# Patient Record
Sex: FEMALE | Race: WHITE
[De-identification: names, ages, dates, MRNs, and addresses within clinical notes are randomized per-mention and may not be internally consistent; named-entity substitution may affect disease eponyms.]

---

## 2020-05-22 ENCOUNTER — HOSPITAL ENCOUNTER (EMERGENCY)
Dept: HOSPITAL 63 - ER | Age: 70
Discharge: HOME | End: 2020-05-22
Payer: MEDICARE

## 2020-05-22 VITALS — DIASTOLIC BLOOD PRESSURE: 62 MMHG | SYSTOLIC BLOOD PRESSURE: 141 MMHG

## 2020-05-22 VITALS — BODY MASS INDEX: 29.58 KG/M2 | WEIGHT: 188.5 LBS | HEIGHT: 67 IN

## 2020-05-22 DIAGNOSIS — Z88.1: ICD-10-CM

## 2020-05-22 DIAGNOSIS — Z88.8: ICD-10-CM

## 2020-05-22 DIAGNOSIS — T45.1X5A: ICD-10-CM

## 2020-05-22 DIAGNOSIS — D70.1: ICD-10-CM
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
## 2020-05-22 DIAGNOSIS — B37.0: Primary | ICD-10-CM

## 2020-05-22 DIAGNOSIS — Z88.5: ICD-10-CM

## 2020-05-22 LAB
ALBUMIN SERPL-MCNC: 2.8 G/DL (ref 3.4–5)
ALBUMIN/GLOB SERPL: 0.8 {RATIO} (ref 1–1.7)
ALP SERPL-CCNC: 76 U/L (ref 46–116)
ALT SERPL-CCNC: 17 U/L (ref 14–59)
ANION GAP SERPL CALC-SCNC: 8 MMOL/L (ref 6–14)
APTT PPP: YELLOW S
AST SERPL-CCNC: 10 U/L (ref 15–37)
BACTERIA #/AREA URNS HPF: (no result) /HPF
BASOPHILS # BLD AUTO: 0 X10^3/UL (ref 0–0.2)
BASOPHILS NFR BLD: 2 % (ref 0–3)
BILIRUB SERPL-MCNC: 0.5 MG/DL (ref 0.2–1)
BILIRUB UR QL STRIP: (no result)
BUN/CREAT SERPL: 30 (ref 6–20)
CA-I SERPL ISE-MCNC: 18 MG/DL (ref 7–20)
CALCIUM SERPL-MCNC: 9.5 MG/DL (ref 8.5–10.1)
CHLORIDE SERPL-SCNC: 100 MMOL/L (ref 98–107)
CO2 SERPL-SCNC: 31 MMOL/L (ref 21–32)
CREAT SERPL-MCNC: 0.6 MG/DL (ref 0.6–1)
EOSINOPHIL NFR BLD: 0 X10^3/UL (ref 0–0.7)
EOSINOPHIL NFR BLD: 3 % (ref 0–3)
ERYTHROCYTE [DISTWIDTH] IN BLOOD BY AUTOMATED COUNT: 18.5 % (ref 11.5–14.5)
FIBRINOGEN PPP-MCNC: CLEAR MG/DL
GFR SERPLBLD BASED ON 1.73 SQ M-ARVRAT: 99.4 ML/MIN
GLOBULIN SER-MCNC: 3.4 G/DL (ref 2.2–3.8)
GLUCOSE SERPL-MCNC: 135 MG/DL (ref 70–99)
GLUCOSE UR STRIP-MCNC: (no result) MG/DL
HCT VFR BLD CALC: 28.8 % (ref 36–47)
HGB BLD-MCNC: 9.3 G/DL (ref 12–15.5)
LYMPHOCYTES # BLD: 0.1 X10^3/UL (ref 1–4.8)
LYMPHOCYTES NFR BLD AUTO: 25 % (ref 24–48)
MCH RBC QN AUTO: 26 PG (ref 25–35)
MCHC RBC AUTO-ENTMCNC: 32 G/DL (ref 31–37)
MCV RBC AUTO: 82 FL (ref 79–100)
MONO #: 0.1 X10^3/UL (ref 0–1.1)
MONOCYTES NFR BLD: 23 % (ref 0–9)
NEUT #: 0.1 X10^3UL (ref 1.8–7.7)
NEUTROPHILS NFR BLD AUTO: 47 % (ref 31–73)
NITRITE UR QL STRIP: (no result)
PLATELET # BLD AUTO: 85 X10^3/UL (ref 140–400)
POTASSIUM SERPL-SCNC: 3.3 MMOL/L (ref 3.5–5.1)
PROT SERPL-MCNC: 6.2 G/DL (ref 6.4–8.2)
RBC # BLD AUTO: 3.51 X10^6/UL (ref 3.5–5.4)
RBC #/AREA URNS HPF: (no result) /HPF (ref 0–2)
SODIUM SERPL-SCNC: 139 MMOL/L (ref 136–145)
SP GR UR STRIP: 1.02
SQUAMOUS #/AREA URNS LPF: (no result) /LPF
UROBILINOGEN UR-MCNC: 1 MG/DL
WBC # BLD AUTO: 0.3 X10^3/UL (ref 4–11)
WBC #/AREA URNS HPF: (no result) /HPF (ref 0–4)

## 2020-05-22 PROCEDURE — 85025 COMPLETE CBC W/AUTO DIFF WBC: CPT

## 2020-05-22 PROCEDURE — 36415 COLL VENOUS BLD VENIPUNCTURE: CPT

## 2020-05-22 PROCEDURE — 99284 EMERGENCY DEPT VISIT MOD MDM: CPT

## 2020-05-22 PROCEDURE — 71046 X-RAY EXAM CHEST 2 VIEWS: CPT

## 2020-05-22 PROCEDURE — 80053 COMPREHEN METABOLIC PANEL: CPT

## 2020-05-22 PROCEDURE — 81001 URINALYSIS AUTO W/SCOPE: CPT

## 2020-05-22 NOTE — RAD
Exam: Chest 2 views

 

INDICATION: Fever, breast cancer

 

TECHNIQUE: Frontal view of the chest

 

Comparisons: 3/11/2020

 

FINDINGS:

Left anterior chest wall port with catheter tip at the SVC again noted.

 

The cardiomediastinal silhouette and pulmonary vessels are within normal 

limits.

 

The lung and pleural spaces are clear.

 

IMPRESSION:

No acute pulmonary process.

 

Electronically signed by: Moy Gama MD (5/22/2020 3:18 PM) [id number]

## 2020-05-22 NOTE — PHYS DOC
General Adult


EDM:


Chief Complaint:  FEVER, thrush





HPI:


HPI:


69-year-old female presents with chief complaint of mouth soreness and thrush.  

The patient is currently on chemotherapy for breast cancer.  Her last treatment 

was 7 days ago.  She had a fever yesterday of 101, but the fever is gone today. 

She spoke with her oncologist who recommended she come to the ER because the 

patient did not want to go to the  emergency room.  She tells me that she is 

feeling pretty okay except for the thrush.  It is worse on the bilateral tongue.

 She was given nystatin in the past and it was effective.  She does not have a 

repeat prescription for this.





Review of Systems:


Review of Systems:


Constitutional: Fever yesterday


Eyes:  Denies change in visual acuity 


HENT: Sore mouth and sore throat 


Respiratory:  Denies cough or shortness of breath 


Cardiovascular:  Denies chest pain or edema 


GI:  Denies abdominal pain, nausea, vomiting, bloody stools or diarrhea 


:  Denies dysuria 


Musculoskeletal:  Denies back pain or joint pain 


Integument:  Denies rash 


Neurologic:  Denies headache, focal weakness or sensory changes 


Endocrine:  Denies polyuria or polydipsia 


Lymphatic:  Denies swollen glands 


Psychiatric:  Denies depression or anxiety





Heart Score:


Risk Factors:


Risk Factors:  DM, Current or recent (<one month) smoker, HTN, HLP, family 

history of CAD, obesity.


Risk Scores:


Score 0 - 3:  2.5% MACE over next 6 weeks - Discharge Home


Score 4 - 6:  20.3% MACE over next 6 weeks - Admit for Clinical Observation


Score 7 - 10:  72.7% MACE over next 6 weeks - Early Invasive Strategies





Allergies:


Allergies:





Allergies








Coded Allergies Type Severity Reaction Last Updated Verified


 


  aspartame Allergy Unknown  5/22/20 Yes


 


  erythromycin base Allergy Unknown  2/9/15 Yes


 


  oxycodone Adverse Reaction Unknown Nausea 5/22/20 Yes











Physical Exam:


PE:





Constitutional: Well developed, well nourished, no acute distress, non-toxic 

appearance. []


HENT: Normocephalic, atraumatic, bilateral external ears normal, oropharynx 

erythematous especially over the right tonsillar fossa, bilateral tongue 

erythema with grayish-white exudate.  []


Eyes: PERRLA, EOMI, conjunctiva normal, no discharge. [] 


Neck: Normal range of motion, no tenderness, supple, no stridor. [] 


Cardiovascular: Heart rate regular rhythm, no murmur []


Lungs & Thorax:  Bilateral breath sounds clear to auscultation []


Abdomen: Bowel sounds normal, soft, no tenderness, no masses, no pulsatile 

masses. [] 


Skin: Warm, dry, no erythema, no rash. [] 


Back: No tenderness, no CVA tenderness. [] 


Extremities: No tenderness, no cyanosis, no clubbing, ROM intact, no edema. [] 


Neurologic: Alert and oriented X 3, normal motor function, normal sensory 

function, no focal deficits noted. []


Psychologic: Affect normal, judgement normal, mood normal. []





EKG:


EKG:


[]





Radiology/Procedures:


Radiology/Procedures:


[]





Course & Med Decision Making:


Course & Med Decision Making


Pertinent Labs and Imaging studies reviewed. (See chart for details)


The patient's labs are significant for a white count of 0.3.  The rest of her 

labs are essentially unremarkable.  She does not have a fever here.  I spoke 

with the oncologist on-call for Dr. Page and Dr. Anaya and he recommended 500 mg

 Levaquin daily for 10 days as well as Diflucan 100 mg daily for 6 days for her 

thrush.  Patient is stable for discharge at this time.  If she develops a fever,

 she should come back to the emergency room or go to [facility] 

or  which are the admitting hospitals for her oncologist.  I will give her 

first dose of Levaquin in the emergency room as well as 2 mg of Diflucan.  


[]





Dragon Disclaimer:


Dragon Disclaimer:


This electronic medical record was generated, in whole or in part, using a voice

 recognition dictation system.





Departure


Departure:


Impression:  


   Primary Impression:  


   Thrush, oral


   Additional Impression:  


   Leukopenia


   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 

   T45.1X5A - Adverse effect of antineoplastic and immunosuppressive drugs, 

   initial encounter


Disposition:  01 HOME/RESIDENCE PRIOR TO ADM


Condition:  STABLE


Referrals:  


BRIT OLIVA MD (PCP)


Patient Instructions:  Neutropenia


Scripts


Levofloxacin (LEVOFLOXACIN) 500 Mg Tablet


1 TAB PO DAILY for infection prophylaxis, #10 TAB


   Prov: JENN MONTGOMERY DO         5/22/20 


Fluconazole (DIFLUCAN) 100 Mg Tablet


100 MG PO DAILY for thrush for 6 Days, #6 TAB


   Prov: JENN MONTGOMERY DO         5/22/20











JENN MONTGOMERY DO                 May 22, 2020 14:18